# Patient Record
Sex: MALE | Race: BLACK OR AFRICAN AMERICAN
[De-identification: names, ages, dates, MRNs, and addresses within clinical notes are randomized per-mention and may not be internally consistent; named-entity substitution may affect disease eponyms.]

---

## 2017-05-30 ENCOUNTER — HOSPITAL ENCOUNTER (OUTPATIENT)
Dept: HOSPITAL 35 - RAD | Age: 67
End: 2017-05-30
Attending: INTERNAL MEDICINE
Payer: COMMERCIAL

## 2017-05-30 DIAGNOSIS — M16.11: Primary | ICD-10-CM

## 2017-05-30 DIAGNOSIS — M19.021: ICD-10-CM

## 2017-09-29 ENCOUNTER — HOSPITAL ENCOUNTER (OUTPATIENT)
Dept: HOSPITAL 35 - MRI | Age: 67
End: 2017-09-29
Attending: INTERNAL MEDICINE
Payer: COMMERCIAL

## 2017-09-29 DIAGNOSIS — M47.896: Primary | ICD-10-CM

## 2017-09-29 DIAGNOSIS — M16.11: ICD-10-CM

## 2017-10-11 ENCOUNTER — HOSPITAL ENCOUNTER (OUTPATIENT)
Dept: HOSPITAL 61 - PCVCCLINIC | Age: 67
Discharge: HOME | End: 2017-10-11
Attending: INTERNAL MEDICINE
Payer: MEDICARE

## 2017-10-11 DIAGNOSIS — Z88.8: ICD-10-CM

## 2017-10-11 DIAGNOSIS — I42.9: ICD-10-CM

## 2017-10-11 DIAGNOSIS — Z87.891: ICD-10-CM

## 2017-10-11 DIAGNOSIS — Z95.1: ICD-10-CM

## 2017-10-11 DIAGNOSIS — I10: ICD-10-CM

## 2017-10-11 DIAGNOSIS — E78.00: ICD-10-CM

## 2017-10-11 DIAGNOSIS — Z79.82: ICD-10-CM

## 2017-10-11 DIAGNOSIS — Z79.899: ICD-10-CM

## 2017-10-11 DIAGNOSIS — I25.10: Primary | ICD-10-CM

## 2017-10-11 DIAGNOSIS — M48.00: ICD-10-CM

## 2017-10-11 PROCEDURE — 80061 LIPID PANEL: CPT

## 2017-10-11 PROCEDURE — 93005 ELECTROCARDIOGRAM TRACING: CPT

## 2017-10-11 PROCEDURE — G0463 HOSPITAL OUTPT CLINIC VISIT: HCPCS

## 2017-10-16 ENCOUNTER — HOSPITAL ENCOUNTER (OUTPATIENT)
Dept: HOSPITAL 61 - PCVCINTER | Age: 67
Discharge: HOME | End: 2017-10-16
Attending: INTERNAL MEDICINE
Payer: MEDICARE

## 2017-10-16 DIAGNOSIS — Z70.1: ICD-10-CM

## 2017-10-16 DIAGNOSIS — I70.213: Primary | ICD-10-CM

## 2017-10-16 DIAGNOSIS — I10: ICD-10-CM

## 2017-10-16 DIAGNOSIS — I25.10: ICD-10-CM

## 2017-10-16 PROCEDURE — 76937 US GUIDE VASCULAR ACCESS: CPT

## 2017-10-16 PROCEDURE — 36246 INS CATH ABD/L-EXT ART 2ND: CPT

## 2017-10-16 PROCEDURE — 99152 MOD SED SAME PHYS/QHP 5/>YRS: CPT

## 2017-10-16 PROCEDURE — C1894 INTRO/SHEATH, NON-LASER: HCPCS

## 2017-10-16 PROCEDURE — C1769 GUIDE WIRE: HCPCS

## 2017-10-16 PROCEDURE — 36252 INS CATH REN ART 1ST BILAT: CPT

## 2017-10-16 PROCEDURE — 75716 ARTERY X-RAYS ARMS/LEGS: CPT

## 2017-10-16 PROCEDURE — C1751 CATH, INF, PER/CENT/MIDLINE: HCPCS

## 2017-10-16 PROCEDURE — 99153 MOD SED SAME PHYS/QHP EA: CPT

## 2017-10-16 PROCEDURE — 93459 L HRT ART/GRFT ANGIO: CPT

## 2017-10-16 NOTE — PCVCINTER
EXAM:

1. AORTOGRAM AND BILATERAL LOWER EXTREMITY RUNOFF ANGIOGRAM

2. BILATERAL RENAL ANGIOGRAPHY



INDICATION: Peripheral arterial disease. Coronary artery disease.

Nonhealing ulcer right lower extremity. Hypertension. Renal

atherosclerosis.



PROCEDURE: Procedure and risks of angiography intervention is

appropriate including limb loss stroke and death were discussed with

the patient's family and consent obtained. The a 6 Zambian sheath in

the right common femoral artery from previous cardiac catheterization

a 5 Zambian flush catheter was placed into the abdominal aorta at the

level of the renal arteries and AP aortogram was performed. Catheter

was positioned at the aortic bifurcation and both oblique views of the

pelvis were obtained. Catheter was positioned into the right external

iliac artery and right leg runoff angiography was performed. Catheter

was exchanged for a visceral catheter which was placed into the right

renal arteries and right renal angiograms obtained. Catheter was

placed into the the left renal arteries and left renal angiograms were

obtained. Catheter was advanced to the level of the left external

iliac artery and left leg runoff angiography was obtained. Catheters

and wires removed. Sheath was removed and hemostasis obtained using

the FISH device. No immediate complications.



FINDINGS:

Aortogram: There is one right and one left renal artery. Mild to

moderate plaque infrarenal abdominal aorta without significant

stenosis or aneurysm.

Pelvis: Mild plaque in the right left common iliac arteries without

significant stenosis. Both internal iliac arteries are patent.

Moderate plaque throughout the mid right external iliac artery causes

mild stenosis not felt to be flow-limiting. Mild plaque left external

iliac artery without significant stenosis. The right and left common

femoral and profunda femoral arteries are patent.

Right renal artery: Mild plaque proximal vessel does not cause

significant stenosis.

Left renal artery: Mild plaque proximal vessel does not cause

significant stenosis.

Right leg: Moderate plaque at the superficial femoral artery and

popliteal artery without flow-limiting stenosis. The peroneal artery

and posterior tibial artery are the dominant runoff vessels and show

good patency throughout. High-grade stenosis mid to distal anterior

tibial artery.

Left leg: Scattered plaque throughout the superficial femoral artery

and popliteal artery without flow-limiting stenosis. The peroneal

artery and posterior tibial arteries are the dominant runoff vessels

and show good patency throughout. Mild stenosis proximal anterior

tibial artery with occlusion of the distal anterior tibial artery.



IMPRESSION:

High-grade stenosis mid to distal right anterior tibial artery and

occlusion of the distal left anterior tibial artery.

Otherwise no flow-limiting stenosis in either lower extremity.

No high-grade aortoiliac stenosis.

follow up



LOC:[id number]

## 2017-10-17 NOTE — PCVCINTER
--------------- APPROVED REPORT --------------





Patient Details

Patient Status:                   Room #: 4

The patient is a 67 year-old Male



Event Personnel

Xander CULP MD, David Cruz RN, Daisy HERNANDEZ MD, Seda Cruz RT(R)(VI), Yecenia Monreal RT(R)



Risk Factors

Arterial HypertensionDysplipidemia (Type: 1), Family History, 

Hypercholesterolemia, Last Creatanine 1Tobacco History 

(Former)



Previous Procedures/Diagnoses

Previous CABG, Previous Femoral Procedure, Previous CHFPrevious MI, 

CAD, Hypertension, LV dysfunction



Procedure Narrative

The patient was brought electively to the Cardiac Catheterization 

Laboratory and was prepped and draped in a sterile manner. The right 

femoral was infiltrated with 1% Lidocaine subcutaneous anesthesia. A 

sheath was inserted into the right femoral artery. Coronary 

angiography was performed using coronary diagnostic catheters. The 

right coronary system was accessed and visualized with a JR4 

catheter. The left coronary system was accessed and visualized with a 

JL4 catheter. The left ventricle was accessed and visualized with a 

Straight pigtail catheter. Left ventriculogram was performed in PALMER 

projection. An aortogram of the abdominal aorta was performed. 

Pre-demployment femoral angiogram was performed . Closure device was 

deployed with a 6 Fr Fish. Hemostasis was obtained with manual 

pressure following sheath removal without any complications. The 

patient tolerated the procedure well and there were no complications 

associated with the procedure. There was no 

hematoma.



Hemodynamics

The right atrial mean pressure is 3 mmHg. The right ventricular 

pressure is 120 mmHg. The pulmonary artery pressure is 120 mmHg with 

a mean of 3 mmHg. The mean pulmonary capillary wedge pressure is 3 

mmHg. The aortic pressure is 124/66 mmHg with a mean of 92 mmHg. The 

left ventricular pressure is 120/4 mmHg with a mean of 3 mmHg. 



Conclusion

#1 normal left ventricular size with mild inferior basilar and apical 

hypokinesis EF 45-50% range

#2 abdominal aorta is intact without aneurysm single renal arteries 

and iliac with mild disease

#3 mild ostial disease of the left main 30% giving rise to LAD and 

circumflex

#4 LAD is moderately calcified and somewhat of an ectatic vessel with 

an eccentric lesions distally of 50-60% small diagonal system may be 

occluded

#5 nondominant circumflex with the proximal OM occlusion

#6 dominant right eccentric 70% in the mid vessel long subtotal area 

competitively filling a posterior lateral branch and no filling of 

the PDA

#7 SVG to the PDA is briskly filling the posterior descending and 

retrograde collaterally filling the posterior lateral branch no 

significant graft disease

#8 SVG to a diagonal and filling a large OM jump graft small diagonal 

moderate size OM to widely patent graft widely 

patent



Recommendations and plan continue aggressive risk factor modification 

etiology of his chest pain is noncardiac.

No lifting for 48 hours to line tub Jacuzzi or Lake for a week

I have added a combination baby aspirin and Prilosec pill I suspect 

some of this chest pain is GI in etiology. Also spoke of some dietary 

restrictions. Follow-up is arranged

## 2018-03-01 ENCOUNTER — HOSPITAL ENCOUNTER (OUTPATIENT)
Dept: HOSPITAL 61 - PCVCCLINIC | Age: 68
Discharge: HOME | End: 2018-03-01
Attending: INTERNAL MEDICINE
Payer: MEDICARE

## 2018-03-01 DIAGNOSIS — Z79.899: ICD-10-CM

## 2018-03-01 DIAGNOSIS — E78.00: ICD-10-CM

## 2018-03-01 DIAGNOSIS — F17.210: ICD-10-CM

## 2018-03-01 DIAGNOSIS — I73.9: ICD-10-CM

## 2018-03-01 DIAGNOSIS — R94.31: ICD-10-CM

## 2018-03-01 DIAGNOSIS — I42.9: ICD-10-CM

## 2018-03-01 DIAGNOSIS — M48.00: ICD-10-CM

## 2018-03-01 DIAGNOSIS — I10: ICD-10-CM

## 2018-03-01 DIAGNOSIS — I25.10: Primary | ICD-10-CM

## 2018-03-01 DIAGNOSIS — Z79.82: ICD-10-CM

## 2018-03-01 PROCEDURE — 80061 LIPID PANEL: CPT

## 2018-03-01 PROCEDURE — 93005 ELECTROCARDIOGRAM TRACING: CPT

## 2018-05-09 ENCOUNTER — HOSPITAL ENCOUNTER (EMERGENCY)
Dept: HOSPITAL 35 - ER | Age: 68
Discharge: LEFT BEFORE DISCHARGE | End: 2018-05-09
Payer: COMMERCIAL

## 2018-05-09 DIAGNOSIS — Z53.21: Primary | ICD-10-CM

## 2019-03-21 ENCOUNTER — HOSPITAL ENCOUNTER (INPATIENT)
Dept: HOSPITAL 35 - ER | Age: 69
LOS: 4 days | Discharge: LEFT BEFORE DISCHARGE | DRG: 377 | End: 2019-03-25
Attending: HOSPITALIST | Admitting: HOSPITALIST
Payer: COMMERCIAL

## 2019-03-21 VITALS — SYSTOLIC BLOOD PRESSURE: 143 MMHG | DIASTOLIC BLOOD PRESSURE: 83 MMHG

## 2019-03-21 VITALS — SYSTOLIC BLOOD PRESSURE: 139 MMHG | DIASTOLIC BLOOD PRESSURE: 75 MMHG

## 2019-03-21 VITALS — SYSTOLIC BLOOD PRESSURE: 136 MMHG | DIASTOLIC BLOOD PRESSURE: 76 MMHG

## 2019-03-21 VITALS — SYSTOLIC BLOOD PRESSURE: 139 MMHG | DIASTOLIC BLOOD PRESSURE: 72 MMHG

## 2019-03-21 VITALS — SYSTOLIC BLOOD PRESSURE: 139 MMHG | DIASTOLIC BLOOD PRESSURE: 83 MMHG

## 2019-03-21 VITALS — SYSTOLIC BLOOD PRESSURE: 125 MMHG | DIASTOLIC BLOOD PRESSURE: 62 MMHG

## 2019-03-21 VITALS — DIASTOLIC BLOOD PRESSURE: 59 MMHG | SYSTOLIC BLOOD PRESSURE: 124 MMHG

## 2019-03-21 VITALS — DIASTOLIC BLOOD PRESSURE: 71 MMHG | SYSTOLIC BLOOD PRESSURE: 131 MMHG

## 2019-03-21 VITALS — SYSTOLIC BLOOD PRESSURE: 112 MMHG | DIASTOLIC BLOOD PRESSURE: 74 MMHG

## 2019-03-21 VITALS — SYSTOLIC BLOOD PRESSURE: 134 MMHG | DIASTOLIC BLOOD PRESSURE: 75 MMHG

## 2019-03-21 VITALS — SYSTOLIC BLOOD PRESSURE: 105 MMHG | DIASTOLIC BLOOD PRESSURE: 58 MMHG

## 2019-03-21 VITALS — DIASTOLIC BLOOD PRESSURE: 77 MMHG | SYSTOLIC BLOOD PRESSURE: 138 MMHG

## 2019-03-21 VITALS — SYSTOLIC BLOOD PRESSURE: 135 MMHG | DIASTOLIC BLOOD PRESSURE: 78 MMHG

## 2019-03-21 VITALS — SYSTOLIC BLOOD PRESSURE: 142 MMHG | DIASTOLIC BLOOD PRESSURE: 66 MMHG

## 2019-03-21 VITALS — SYSTOLIC BLOOD PRESSURE: 132 MMHG | DIASTOLIC BLOOD PRESSURE: 73 MMHG

## 2019-03-21 VITALS — SYSTOLIC BLOOD PRESSURE: 140 MMHG | DIASTOLIC BLOOD PRESSURE: 53 MMHG

## 2019-03-21 VITALS — DIASTOLIC BLOOD PRESSURE: 60 MMHG | SYSTOLIC BLOOD PRESSURE: 109 MMHG

## 2019-03-21 VITALS — SYSTOLIC BLOOD PRESSURE: 128 MMHG | DIASTOLIC BLOOD PRESSURE: 57 MMHG

## 2019-03-21 VITALS — WEIGHT: 164.25 LBS | BODY MASS INDEX: 21.77 KG/M2 | HEIGHT: 72.99 IN

## 2019-03-21 VITALS — SYSTOLIC BLOOD PRESSURE: 131 MMHG | DIASTOLIC BLOOD PRESSURE: 77 MMHG

## 2019-03-21 VITALS — DIASTOLIC BLOOD PRESSURE: 60 MMHG | SYSTOLIC BLOOD PRESSURE: 123 MMHG

## 2019-03-21 VITALS — DIASTOLIC BLOOD PRESSURE: 64 MMHG | SYSTOLIC BLOOD PRESSURE: 110 MMHG

## 2019-03-21 VITALS — SYSTOLIC BLOOD PRESSURE: 123 MMHG | DIASTOLIC BLOOD PRESSURE: 64 MMHG

## 2019-03-21 VITALS — DIASTOLIC BLOOD PRESSURE: 55 MMHG | SYSTOLIC BLOOD PRESSURE: 105 MMHG

## 2019-03-21 VITALS — DIASTOLIC BLOOD PRESSURE: 55 MMHG | SYSTOLIC BLOOD PRESSURE: 119 MMHG

## 2019-03-21 VITALS — DIASTOLIC BLOOD PRESSURE: 73 MMHG | SYSTOLIC BLOOD PRESSURE: 124 MMHG

## 2019-03-21 VITALS — DIASTOLIC BLOOD PRESSURE: 76 MMHG | SYSTOLIC BLOOD PRESSURE: 136 MMHG

## 2019-03-21 VITALS — DIASTOLIC BLOOD PRESSURE: 71 MMHG | SYSTOLIC BLOOD PRESSURE: 136 MMHG

## 2019-03-21 VITALS — SYSTOLIC BLOOD PRESSURE: 143 MMHG | DIASTOLIC BLOOD PRESSURE: 78 MMHG

## 2019-03-21 VITALS — SYSTOLIC BLOOD PRESSURE: 144 MMHG | DIASTOLIC BLOOD PRESSURE: 84 MMHG

## 2019-03-21 VITALS — DIASTOLIC BLOOD PRESSURE: 62 MMHG | SYSTOLIC BLOOD PRESSURE: 112 MMHG

## 2019-03-21 DIAGNOSIS — Z88.5: ICD-10-CM

## 2019-03-21 DIAGNOSIS — Z88.0: ICD-10-CM

## 2019-03-21 DIAGNOSIS — M13.871: ICD-10-CM

## 2019-03-21 DIAGNOSIS — I25.10: ICD-10-CM

## 2019-03-21 DIAGNOSIS — M13.862: ICD-10-CM

## 2019-03-21 DIAGNOSIS — M13.872: ICD-10-CM

## 2019-03-21 DIAGNOSIS — E78.5: ICD-10-CM

## 2019-03-21 DIAGNOSIS — D62: ICD-10-CM

## 2019-03-21 DIAGNOSIS — E87.2: ICD-10-CM

## 2019-03-21 DIAGNOSIS — F10.10: ICD-10-CM

## 2019-03-21 DIAGNOSIS — K25.4: Primary | ICD-10-CM

## 2019-03-21 DIAGNOSIS — I10: ICD-10-CM

## 2019-03-21 DIAGNOSIS — R63.4: ICD-10-CM

## 2019-03-21 DIAGNOSIS — E87.6: ICD-10-CM

## 2019-03-21 DIAGNOSIS — K76.0: ICD-10-CM

## 2019-03-21 DIAGNOSIS — F17.210: ICD-10-CM

## 2019-03-21 DIAGNOSIS — K26.9: ICD-10-CM

## 2019-03-21 DIAGNOSIS — E87.0: ICD-10-CM

## 2019-03-21 DIAGNOSIS — Z95.1: ICD-10-CM

## 2019-03-21 DIAGNOSIS — M13.861: ICD-10-CM

## 2019-03-21 DIAGNOSIS — J69.0: ICD-10-CM

## 2019-03-21 DIAGNOSIS — Z79.899: ICD-10-CM

## 2019-03-21 DIAGNOSIS — G93.40: ICD-10-CM

## 2019-03-21 DIAGNOSIS — Z88.8: ICD-10-CM

## 2019-03-21 DIAGNOSIS — Z53.21: ICD-10-CM

## 2019-03-21 LAB
ANION GAP SERPL CALC-SCNC: 21 MMOL/L (ref 7–16)
APTT BLD: 23.8 SECONDS (ref 24.5–32.8)
BASOPHILS NFR BLD AUTO: 0.3 % (ref 0–2)
BE(VIVO): -11.7 MMOL/L
BUN SERPL-MCNC: 37 MG/DL (ref 7–18)
CALCIUM SERPL-MCNC: 7.5 MG/DL (ref 8.5–10.1)
CHLORIDE SERPL-SCNC: 113 MMOL/L (ref 98–107)
CO2 SERPL-SCNC: 17 MMOL/L (ref 21–32)
CREAT SERPL-MCNC: 1.7 MG/DL (ref 0.7–1.3)
EOSINOPHIL NFR BLD: 0 % (ref 0–3)
ERYTHROCYTE [DISTWIDTH] IN BLOOD BY AUTOMATED COUNT: 17 % (ref 10.5–14.5)
GLUCOSE SERPL-MCNC: 190 MG/DL (ref 74–106)
GRANULOCYTES NFR BLD MANUAL: 83.8 % (ref 36–66)
HCO3 BLD-SCNC: 12.7 MMOL/L (ref 22–26)
HCT VFR BLD CALC: 15.6 % (ref 42–52)
HCT VFR BLD CALC: 26.5 % (ref 42–52)
HGB BLD-MCNC: 4.7 GM/DL (ref 14–18)
HGB BLD-MCNC: 8.7 GM/DL (ref 14–18)
INR PPP: 1.3
LYMPHOCYTES NFR BLD AUTO: 9.9 % (ref 24–44)
MCH RBC QN AUTO: 31.7 PG (ref 26–34)
MCHC RBC AUTO-ENTMCNC: 30.2 G/DL (ref 28–37)
MCV RBC: 105 FL (ref 80–100)
MONOCYTES NFR BLD: 6 % (ref 1–8)
NEUTROPHILS # BLD: 12.5 THOU/UL (ref 1.4–8.2)
PCO2 BLD: 22.2 MMHG (ref 35–45)
PLATELET # BLD: 176 THOU/UL (ref 150–400)
PO2 BLD: 94.5 MMHG (ref 80–100)
POTASSIUM SERPL-SCNC: 3.3 MMOL/L (ref 3.5–5.1)
PROTHROMBIN TIME: 13.1 SECONDS (ref 9.3–11.4)
RBC # BLD AUTO: 1.48 MIL/UL (ref 4.5–6)
SODIUM SERPL-SCNC: 151 MMOL/L (ref 136–145)
TROPONIN I SERPL-MCNC: 0.07 NG/ML (ref ?–0.06)
WBC # BLD AUTO: 14.9 THOU/UL (ref 4–11)

## 2019-03-21 PROCEDURE — 62110: CPT

## 2019-03-21 PROCEDURE — 10078: CPT

## 2019-03-21 PROCEDURE — 10081 I&D PILONIDAL CYST COMP: CPT

## 2019-03-21 PROCEDURE — 62900: CPT

## 2019-03-21 PROCEDURE — 27000 TENOTOMY ADDUCTOR HIP PERQ: CPT

## 2019-03-22 VITALS — SYSTOLIC BLOOD PRESSURE: 120 MMHG | DIASTOLIC BLOOD PRESSURE: 65 MMHG

## 2019-03-22 VITALS — SYSTOLIC BLOOD PRESSURE: 146 MMHG | DIASTOLIC BLOOD PRESSURE: 85 MMHG

## 2019-03-22 VITALS — SYSTOLIC BLOOD PRESSURE: 147 MMHG | DIASTOLIC BLOOD PRESSURE: 81 MMHG

## 2019-03-22 VITALS — DIASTOLIC BLOOD PRESSURE: 76 MMHG | SYSTOLIC BLOOD PRESSURE: 130 MMHG

## 2019-03-22 VITALS — SYSTOLIC BLOOD PRESSURE: 125 MMHG | DIASTOLIC BLOOD PRESSURE: 80 MMHG

## 2019-03-22 VITALS — SYSTOLIC BLOOD PRESSURE: 141 MMHG | DIASTOLIC BLOOD PRESSURE: 68 MMHG

## 2019-03-22 VITALS — SYSTOLIC BLOOD PRESSURE: 121 MMHG | DIASTOLIC BLOOD PRESSURE: 65 MMHG

## 2019-03-22 VITALS — DIASTOLIC BLOOD PRESSURE: 75 MMHG | SYSTOLIC BLOOD PRESSURE: 133 MMHG

## 2019-03-22 VITALS — DIASTOLIC BLOOD PRESSURE: 64 MMHG | SYSTOLIC BLOOD PRESSURE: 119 MMHG

## 2019-03-22 VITALS — DIASTOLIC BLOOD PRESSURE: 66 MMHG | SYSTOLIC BLOOD PRESSURE: 132 MMHG

## 2019-03-22 VITALS — DIASTOLIC BLOOD PRESSURE: 64 MMHG | SYSTOLIC BLOOD PRESSURE: 121 MMHG

## 2019-03-22 VITALS — DIASTOLIC BLOOD PRESSURE: 56 MMHG | SYSTOLIC BLOOD PRESSURE: 128 MMHG

## 2019-03-22 VITALS — SYSTOLIC BLOOD PRESSURE: 126 MMHG | DIASTOLIC BLOOD PRESSURE: 63 MMHG

## 2019-03-22 VITALS — DIASTOLIC BLOOD PRESSURE: 83 MMHG | SYSTOLIC BLOOD PRESSURE: 153 MMHG

## 2019-03-22 VITALS — DIASTOLIC BLOOD PRESSURE: 69 MMHG | SYSTOLIC BLOOD PRESSURE: 127 MMHG

## 2019-03-22 VITALS — DIASTOLIC BLOOD PRESSURE: 62 MMHG | SYSTOLIC BLOOD PRESSURE: 120 MMHG

## 2019-03-22 VITALS — SYSTOLIC BLOOD PRESSURE: 141 MMHG | DIASTOLIC BLOOD PRESSURE: 71 MMHG

## 2019-03-22 VITALS — DIASTOLIC BLOOD PRESSURE: 92 MMHG | SYSTOLIC BLOOD PRESSURE: 147 MMHG

## 2019-03-22 VITALS — SYSTOLIC BLOOD PRESSURE: 123 MMHG | DIASTOLIC BLOOD PRESSURE: 64 MMHG

## 2019-03-22 VITALS — DIASTOLIC BLOOD PRESSURE: 80 MMHG | SYSTOLIC BLOOD PRESSURE: 150 MMHG

## 2019-03-22 VITALS — SYSTOLIC BLOOD PRESSURE: 129 MMHG | DIASTOLIC BLOOD PRESSURE: 74 MMHG

## 2019-03-22 VITALS — SYSTOLIC BLOOD PRESSURE: 134 MMHG | DIASTOLIC BLOOD PRESSURE: 63 MMHG

## 2019-03-22 VITALS — SYSTOLIC BLOOD PRESSURE: 130 MMHG | DIASTOLIC BLOOD PRESSURE: 65 MMHG

## 2019-03-22 VITALS — SYSTOLIC BLOOD PRESSURE: 114 MMHG | DIASTOLIC BLOOD PRESSURE: 60 MMHG

## 2019-03-22 VITALS — DIASTOLIC BLOOD PRESSURE: 73 MMHG | SYSTOLIC BLOOD PRESSURE: 132 MMHG

## 2019-03-22 VITALS — DIASTOLIC BLOOD PRESSURE: 56 MMHG | SYSTOLIC BLOOD PRESSURE: 118 MMHG

## 2019-03-22 VITALS — DIASTOLIC BLOOD PRESSURE: 68 MMHG | SYSTOLIC BLOOD PRESSURE: 125 MMHG

## 2019-03-22 VITALS — DIASTOLIC BLOOD PRESSURE: 69 MMHG | SYSTOLIC BLOOD PRESSURE: 131 MMHG

## 2019-03-22 VITALS — SYSTOLIC BLOOD PRESSURE: 126 MMHG | DIASTOLIC BLOOD PRESSURE: 64 MMHG

## 2019-03-22 VITALS — DIASTOLIC BLOOD PRESSURE: 72 MMHG | SYSTOLIC BLOOD PRESSURE: 133 MMHG

## 2019-03-22 VITALS — DIASTOLIC BLOOD PRESSURE: 45 MMHG | SYSTOLIC BLOOD PRESSURE: 111 MMHG

## 2019-03-22 VITALS — DIASTOLIC BLOOD PRESSURE: 72 MMHG | SYSTOLIC BLOOD PRESSURE: 129 MMHG

## 2019-03-22 VITALS — DIASTOLIC BLOOD PRESSURE: 72 MMHG | SYSTOLIC BLOOD PRESSURE: 111 MMHG

## 2019-03-22 VITALS — DIASTOLIC BLOOD PRESSURE: 66 MMHG | SYSTOLIC BLOOD PRESSURE: 129 MMHG

## 2019-03-22 VITALS — SYSTOLIC BLOOD PRESSURE: 124 MMHG | DIASTOLIC BLOOD PRESSURE: 63 MMHG

## 2019-03-22 VITALS — SYSTOLIC BLOOD PRESSURE: 131 MMHG | DIASTOLIC BLOOD PRESSURE: 76 MMHG

## 2019-03-22 VITALS — SYSTOLIC BLOOD PRESSURE: 132 MMHG | DIASTOLIC BLOOD PRESSURE: 70 MMHG

## 2019-03-22 VITALS — SYSTOLIC BLOOD PRESSURE: 133 MMHG | DIASTOLIC BLOOD PRESSURE: 65 MMHG

## 2019-03-22 VITALS — SYSTOLIC BLOOD PRESSURE: 126 MMHG | DIASTOLIC BLOOD PRESSURE: 68 MMHG

## 2019-03-22 VITALS — DIASTOLIC BLOOD PRESSURE: 73 MMHG | SYSTOLIC BLOOD PRESSURE: 127 MMHG

## 2019-03-22 VITALS — SYSTOLIC BLOOD PRESSURE: 137 MMHG | DIASTOLIC BLOOD PRESSURE: 66 MMHG

## 2019-03-22 VITALS — DIASTOLIC BLOOD PRESSURE: 72 MMHG | SYSTOLIC BLOOD PRESSURE: 138 MMHG

## 2019-03-22 VITALS — SYSTOLIC BLOOD PRESSURE: 130 MMHG | DIASTOLIC BLOOD PRESSURE: 71 MMHG

## 2019-03-22 VITALS — SYSTOLIC BLOOD PRESSURE: 159 MMHG | DIASTOLIC BLOOD PRESSURE: 89 MMHG

## 2019-03-22 VITALS — SYSTOLIC BLOOD PRESSURE: 113 MMHG | DIASTOLIC BLOOD PRESSURE: 58 MMHG

## 2019-03-22 VITALS — SYSTOLIC BLOOD PRESSURE: 129 MMHG | DIASTOLIC BLOOD PRESSURE: 65 MMHG

## 2019-03-22 VITALS — SYSTOLIC BLOOD PRESSURE: 139 MMHG | DIASTOLIC BLOOD PRESSURE: 79 MMHG

## 2019-03-22 VITALS — DIASTOLIC BLOOD PRESSURE: 61 MMHG | SYSTOLIC BLOOD PRESSURE: 128 MMHG

## 2019-03-22 VITALS — DIASTOLIC BLOOD PRESSURE: 64 MMHG | SYSTOLIC BLOOD PRESSURE: 132 MMHG

## 2019-03-22 VITALS — SYSTOLIC BLOOD PRESSURE: 137 MMHG | DIASTOLIC BLOOD PRESSURE: 76 MMHG

## 2019-03-22 VITALS — SYSTOLIC BLOOD PRESSURE: 134 MMHG | DIASTOLIC BLOOD PRESSURE: 76 MMHG

## 2019-03-22 VITALS — DIASTOLIC BLOOD PRESSURE: 67 MMHG | SYSTOLIC BLOOD PRESSURE: 128 MMHG

## 2019-03-22 VITALS — SYSTOLIC BLOOD PRESSURE: 106 MMHG | DIASTOLIC BLOOD PRESSURE: 67 MMHG

## 2019-03-22 VITALS — DIASTOLIC BLOOD PRESSURE: 66 MMHG | SYSTOLIC BLOOD PRESSURE: 119 MMHG

## 2019-03-22 VITALS — SYSTOLIC BLOOD PRESSURE: 129 MMHG | DIASTOLIC BLOOD PRESSURE: 68 MMHG

## 2019-03-22 VITALS — DIASTOLIC BLOOD PRESSURE: 83 MMHG | SYSTOLIC BLOOD PRESSURE: 146 MMHG

## 2019-03-22 VITALS — SYSTOLIC BLOOD PRESSURE: 129 MMHG | DIASTOLIC BLOOD PRESSURE: 94 MMHG

## 2019-03-22 VITALS — DIASTOLIC BLOOD PRESSURE: 76 MMHG | SYSTOLIC BLOOD PRESSURE: 120 MMHG

## 2019-03-22 VITALS — SYSTOLIC BLOOD PRESSURE: 118 MMHG | DIASTOLIC BLOOD PRESSURE: 62 MMHG

## 2019-03-22 VITALS — SYSTOLIC BLOOD PRESSURE: 107 MMHG | DIASTOLIC BLOOD PRESSURE: 59 MMHG

## 2019-03-22 VITALS — SYSTOLIC BLOOD PRESSURE: 121 MMHG | DIASTOLIC BLOOD PRESSURE: 76 MMHG

## 2019-03-22 LAB
ALBUMIN SERPL-MCNC: 1.9 G/DL (ref 3.4–5)
ALT SERPL-CCNC: 21 U/L (ref 30–65)
ANION GAP SERPL CALC-SCNC: 8 MMOL/L (ref 7–16)
ANION GAP SERPL CALC-SCNC: 8 MMOL/L (ref 7–16)
AST SERPL-CCNC: 39 U/L (ref 15–37)
BASOPHILS NFR BLD AUTO: 0.5 % (ref 0–2)
BASOPHILS NFR BLD AUTO: 0.6 % (ref 0–2)
BILIRUB DIRECT SERPL-MCNC: 0.2 MG/DL
BILIRUB SERPL-MCNC: 0.6 MG/DL
BUN SERPL-MCNC: 33 MG/DL (ref 7–18)
BUN SERPL-MCNC: 37 MG/DL (ref 7–18)
CALCIUM SERPL-MCNC: 6.6 MG/DL (ref 8.5–10.1)
CALCIUM SERPL-MCNC: 6.8 MG/DL (ref 8.5–10.1)
CHLORIDE SERPL-SCNC: 116 MMOL/L (ref 98–107)
CHLORIDE SERPL-SCNC: 116 MMOL/L (ref 98–107)
CO2 SERPL-SCNC: 24 MMOL/L (ref 21–32)
CO2 SERPL-SCNC: 26 MMOL/L (ref 21–32)
CREAT SERPL-MCNC: 1 MG/DL (ref 0.7–1.3)
CREAT SERPL-MCNC: 1.2 MG/DL (ref 0.7–1.3)
EOSINOPHIL NFR BLD: 0.1 % (ref 0–3)
EOSINOPHIL NFR BLD: 0.2 % (ref 0–3)
ERYTHROCYTE [DISTWIDTH] IN BLOOD BY AUTOMATED COUNT: 16.4 % (ref 10.5–14.5)
ERYTHROCYTE [DISTWIDTH] IN BLOOD BY AUTOMATED COUNT: 16.5 % (ref 10.5–14.5)
ERYTHROCYTE [DISTWIDTH] IN BLOOD BY AUTOMATED COUNT: 16.5 % (ref 10.5–14.5)
GLUCOSE SERPL-MCNC: 101 MG/DL (ref 74–106)
GLUCOSE SERPL-MCNC: 121 MG/DL (ref 74–106)
GRANULOCYTES NFR BLD MANUAL: 75 % (ref 36–66)
GRANULOCYTES NFR BLD MANUAL: 78.4 % (ref 36–66)
HCT VFR BLD CALC: 20.1 % (ref 42–52)
HCT VFR BLD CALC: 20.8 % (ref 42–52)
HCT VFR BLD CALC: 20.9 % (ref 42–52)
HCT VFR BLD CALC: 25.6 % (ref 42–52)
HGB BLD-MCNC: 6.7 GM/DL (ref 14–18)
HGB BLD-MCNC: 7.1 GM/DL (ref 14–18)
HGB BLD-MCNC: 7.1 GM/DL (ref 14–18)
HGB BLD-MCNC: 8.5 GM/DL (ref 14–18)
INR PPP: 1.1
LYMPHOCYTES NFR BLD AUTO: 14.1 % (ref 24–44)
LYMPHOCYTES NFR BLD AUTO: 16.8 % (ref 24–44)
MAGNESIUM SERPL-MCNC: 1.6 MG/DL (ref 1.8–2.4)
MAGNESIUM SERPL-MCNC: 2.1 MG/DL (ref 1.8–2.4)
MCH RBC QN AUTO: 30.7 PG (ref 26–34)
MCH RBC QN AUTO: 31 PG (ref 26–34)
MCH RBC QN AUTO: 31.2 PG (ref 26–34)
MCHC RBC AUTO-ENTMCNC: 33.3 G/DL (ref 28–37)
MCHC RBC AUTO-ENTMCNC: 33.6 G/DL (ref 28–37)
MCHC RBC AUTO-ENTMCNC: 34.2 G/DL (ref 28–37)
MCV RBC: 91.2 FL (ref 80–100)
MCV RBC: 91.5 FL (ref 80–100)
MCV RBC: 93.2 FL (ref 80–100)
MONOCYTES NFR BLD: 6.9 % (ref 1–8)
MONOCYTES NFR BLD: 7.4 % (ref 1–8)
NEUTROPHILS # BLD: 10.6 THOU/UL (ref 1.4–8.2)
NEUTROPHILS # BLD: 9 THOU/UL (ref 1.4–8.2)
PLATELET # BLD: 116 THOU/UL (ref 150–400)
PLATELET # BLD: 119 THOU/UL (ref 150–400)
PLATELET # BLD: 126 THOU/UL (ref 150–400)
POTASSIUM SERPL-SCNC: 3.4 MMOL/L (ref 3.5–5.1)
POTASSIUM SERPL-SCNC: 3.4 MMOL/L (ref 3.5–5.1)
PROT SERPL-MCNC: 4.3 G/DL (ref 6.4–8.2)
PROTHROMBIN TIME: 12 SECONDS (ref 9.3–11.4)
RBC # BLD AUTO: 2.19 MIL/UL (ref 4.5–6)
RBC # BLD AUTO: 2.29 MIL/UL (ref 4.5–6)
RBC # BLD AUTO: 2.75 MIL/UL (ref 4.5–6)
SODIUM SERPL-SCNC: 148 MMOL/L (ref 136–145)
SODIUM SERPL-SCNC: 150 MMOL/L (ref 136–145)
TROPONIN I SERPL-MCNC: 0.07 NG/ML (ref ?–0.06)
WBC # BLD AUTO: 12 THOU/UL (ref 4–11)
WBC # BLD AUTO: 13.5 THOU/UL (ref 4–11)
WBC # BLD AUTO: 17.4 THOU/UL (ref 4–11)

## 2019-03-22 NOTE — NUR
PT IS ALERT TO SELF AND PLACE. FOREGETFULL ON THE YEAR. FAMILY AT BEDSIDE FOR
SUPPORT. CENTRAL LINE PLACED TODAY PER IV TEAM. LABS DRAWN. LUNGS ARE CLEAR ON
3 LITERS NASAL CANULA. SINUS RHYTHM ON THE CARDIAC MONITOR. ON CLEAR LIQUID
DIET TODAY. MOLINA TO DD WITH YELLOW URINE PRESENT. SCDS ON BILATERAL. SLEEPING
ON AND OFF TODAY. FAMILY AT BEDSIDE FOR SUPPORT. TODAY. NO CONCERNS OR ISSUES
NOTED AT THIS TIME. WILL CONTINUE TO ASSESS AND MONITOR PER NURSING

## 2019-03-22 NOTE — NUR
met with patient who admits with GI Bleed. He reports year 2018, and he is at
[facility]. He reports PCP is Dr Aparicio. Patient recalls being at [facility]
recently and reports he "discharged himself." He reports he called a friend at
0400 am to take him home. PTA independent with adls. Sp with sister to verify
information. Sister Sosa reports sometimes she stays with patient. She
reports he did leave AMA from [facility]. Sister wants medical team
to be aware he is a daily drinker and may become agitated at hospital. Updated
RN. Sister reports to call her she wants him to remain in hospital to get
well. Sister cell [phone]. Udpated RN. Patient does have PCP and
independent pta and driving. Sister supportive, casemgt following.

## 2019-03-22 NOTE — NUR
CONSULTED TO PLACE A PICC FOR A PATIENT IN ICU NEEDING ACCESS. ORDER AND
CONSENT NOTED. THE PROCEDURE AS WELL AS BENIFITS AND RISK FOR DVT AND
INFECTION DISCUSSED AND HE VERBALIZED UNDERSTANDING. THE LEFT UPPER ARM
BRACHIAL WAS WIDLEY PATENT. A #5F TRIPLE LUMEN POWER PICC WAS PLACED AFTER A
BEDSIDE TIMEOUT WAS COMPLETE. PICC WAS TRIMMED TO 42CM AND ADVANCED WITHOUT
DIFFICULTY PER HOSPITAL POLICY. A STAT CHEST XRAY CONFIRMED LINE TO BE IN
PROPER POSITION IN THE LOWER SVC. LINE REALEASED FOR USE

## 2019-03-22 NOTE — NUR
ARRIVED IN ICU FROM ER AT 2045. PT ADMITTED FOR GI BLEED. PT HAD ONE MAROON BM
BEFORE ARRIVING IN THE ICU AND HAS NOT HAD ANY EVIDENCE OF BLEEDING SINCE
THEN. PT RECEIVED 3 UNITS PRBC AND 2 UNITS FFP TOTAL. HGB HAD IMPROVED TO 8.7
AFTER THE TRANSFUSIONS, AND WAS 7.1 WHEN CHECKED ALMOST AN HOUR AGO. WILL
CHECK HGB AGAIN IN 3 HOURS. EGD DONE LAST NIGHT AFTER ARRIVAL TO THE ICU. PT
RECOVERED WELL. VITAL SIGNS STABLE AND A&O POST PROCEDURE. ON PROTONIX GTT.
UPDATED SISTER, JOLYNN, ON PT'S CONDITION. WILL CONTINUE TO MONITOR.

## 2019-03-23 VITALS — SYSTOLIC BLOOD PRESSURE: 138 MMHG | DIASTOLIC BLOOD PRESSURE: 74 MMHG

## 2019-03-23 VITALS — SYSTOLIC BLOOD PRESSURE: 119 MMHG | DIASTOLIC BLOOD PRESSURE: 65 MMHG

## 2019-03-23 VITALS — SYSTOLIC BLOOD PRESSURE: 153 MMHG | DIASTOLIC BLOOD PRESSURE: 83 MMHG

## 2019-03-23 VITALS — DIASTOLIC BLOOD PRESSURE: 96 MMHG | SYSTOLIC BLOOD PRESSURE: 162 MMHG

## 2019-03-23 VITALS — DIASTOLIC BLOOD PRESSURE: 78 MMHG | SYSTOLIC BLOOD PRESSURE: 155 MMHG

## 2019-03-23 VITALS — DIASTOLIC BLOOD PRESSURE: 73 MMHG | SYSTOLIC BLOOD PRESSURE: 127 MMHG

## 2019-03-23 VITALS — DIASTOLIC BLOOD PRESSURE: 75 MMHG | SYSTOLIC BLOOD PRESSURE: 142 MMHG

## 2019-03-23 VITALS — SYSTOLIC BLOOD PRESSURE: 114 MMHG | DIASTOLIC BLOOD PRESSURE: 61 MMHG

## 2019-03-23 VITALS — SYSTOLIC BLOOD PRESSURE: 140 MMHG | DIASTOLIC BLOOD PRESSURE: 77 MMHG

## 2019-03-23 VITALS — DIASTOLIC BLOOD PRESSURE: 80 MMHG | SYSTOLIC BLOOD PRESSURE: 149 MMHG

## 2019-03-23 VITALS — DIASTOLIC BLOOD PRESSURE: 73 MMHG | SYSTOLIC BLOOD PRESSURE: 140 MMHG

## 2019-03-23 VITALS — DIASTOLIC BLOOD PRESSURE: 77 MMHG | SYSTOLIC BLOOD PRESSURE: 140 MMHG

## 2019-03-23 VITALS — SYSTOLIC BLOOD PRESSURE: 151 MMHG | DIASTOLIC BLOOD PRESSURE: 85 MMHG

## 2019-03-23 LAB
ANION GAP SERPL CALC-SCNC: 7 MMOL/L (ref 7–16)
BUN SERPL-MCNC: 19 MG/DL (ref 7–18)
CALCIUM SERPL-MCNC: 7 MG/DL (ref 8.5–10.1)
CHLORIDE SERPL-SCNC: 114 MMOL/L (ref 98–107)
CO2 SERPL-SCNC: 23 MMOL/L (ref 21–32)
CREAT SERPL-MCNC: 0.8 MG/DL (ref 0.7–1.3)
GLUCOSE SERPL-MCNC: 72 MG/DL (ref 74–106)
HCT VFR BLD CALC: 22.6 % (ref 42–52)
HCT VFR BLD CALC: 24 % (ref 42–52)
HCT VFR BLD CALC: 24.4 % (ref 42–52)
HCT VFR BLD CALC: 25 % (ref 42–52)
HGB BLD-MCNC: 7.9 GM/DL (ref 14–18)
HGB BLD-MCNC: 8.2 GM/DL (ref 14–18)
HGB BLD-MCNC: 8.3 GM/DL (ref 14–18)
HGB BLD-MCNC: 8.6 GM/DL (ref 14–18)
POTASSIUM SERPL-SCNC: 3.8 MMOL/L (ref 3.5–5.1)
SODIUM SERPL-SCNC: 144 MMOL/L (ref 136–145)

## 2019-03-23 NOTE — NUR
PT ABLE TO REST THROUGHOUT SHIFT. CONTINUED WITH O2 THERAPY 3-4L PER NC. PT
RECEIVED ONE UNIT OF PRBCs DURING SHIFT. PT SR/ST ON MONITOR. PT TOLERATING
CLEAR LIQUIDS. PT CONTINUES ON MAINTENANCE IVF AND PROTONIX GTT. PT POTASSIUM
REPLACED AND NOW STABLE.

## 2019-03-23 NOTE — NUR
AA0X2 REORIENTS EASILY.  PLEASANT AND COOPERATIVE.  C/O PAIN IN RIGHT ARM,
RIGHT ARM FIRM AND EDEMATOUS.  INCONTINENT OF LARGE AMT LIQUID MAROON FOWL
ODOR STOOL.  PROVIDED SHOWER.  MOLINA DISCONTINUED AND URINATED LARGE AMOUNT ON
FLOOR.  CLEAR LIQUID DIET. IV LEFT UPPER ARM.

## 2019-03-23 NOTE — NUR
Assumed care of patient at 0700.  Patient resting in bed, c/o feeling hungry.
Tolerating clear liquids of ice chips and popcicles.  Denies nausea and abd
pain.  Continuing with Q6 H&H's, hopefully to advance diet if Hgb remains
stable per GI service.  Ok'd to move out of ICU.  Report called to RN on CCU,
patient to move to 204.  IVF and protonix gtt continue.

## 2019-03-24 VITALS — DIASTOLIC BLOOD PRESSURE: 74 MMHG | SYSTOLIC BLOOD PRESSURE: 143 MMHG

## 2019-03-24 VITALS — DIASTOLIC BLOOD PRESSURE: 96 MMHG | SYSTOLIC BLOOD PRESSURE: 151 MMHG

## 2019-03-24 VITALS — DIASTOLIC BLOOD PRESSURE: 82 MMHG | SYSTOLIC BLOOD PRESSURE: 136 MMHG

## 2019-03-24 VITALS — DIASTOLIC BLOOD PRESSURE: 91 MMHG | SYSTOLIC BLOOD PRESSURE: 151 MMHG

## 2019-03-24 VITALS — DIASTOLIC BLOOD PRESSURE: 70 MMHG | SYSTOLIC BLOOD PRESSURE: 112 MMHG

## 2019-03-24 LAB
ANION GAP SERPL CALC-SCNC: 13 MMOL/L (ref 7–16)
ANION GAP SERPL CALC-SCNC: 14 MMOL/L (ref 7–16)
BUN SERPL-MCNC: 7 MG/DL (ref 7–18)
BUN SERPL-MCNC: 7 MG/DL (ref 7–18)
CALCIUM SERPL-MCNC: 6.1 MG/DL (ref 8.5–10.1)
CALCIUM SERPL-MCNC: 7.1 MG/DL (ref 8.5–10.1)
CHLORIDE SERPL-SCNC: 104 MMOL/L (ref 98–107)
CHLORIDE SERPL-SCNC: 109 MMOL/L (ref 98–107)
CO2 SERPL-SCNC: 18 MMOL/L (ref 21–32)
CO2 SERPL-SCNC: 22 MMOL/L (ref 21–32)
CREAT SERPL-MCNC: 0.6 MG/DL (ref 0.7–1.3)
CREAT SERPL-MCNC: 0.7 MG/DL (ref 0.7–1.3)
ERYTHROCYTE [DISTWIDTH] IN BLOOD BY AUTOMATED COUNT: 15.9 % (ref 10.5–14.5)
GLUCOSE SERPL-MCNC: 58 MG/DL (ref 74–106)
GLUCOSE SERPL-MCNC: 60 MG/DL (ref 74–106)
HCT VFR BLD CALC: 22.4 % (ref 42–52)
HCT VFR BLD CALC: 25.2 % (ref 42–52)
HGB BLD-MCNC: 7.8 GM/DL (ref 14–18)
HGB BLD-MCNC: 8.9 GM/DL (ref 14–18)
MAGNESIUM SERPL-MCNC: 1.5 MG/DL (ref 1.8–2.4)
MAGNESIUM SERPL-MCNC: 2.1 MG/DL (ref 1.8–2.4)
MCH RBC QN AUTO: 32 PG (ref 26–34)
MCHC RBC AUTO-ENTMCNC: 34.9 G/DL (ref 28–37)
MCV RBC: 91.7 FL (ref 80–100)
PLATELET # BLD: 142 THOU/UL (ref 150–400)
POTASSIUM SERPL-SCNC: 2.6 MMOL/L (ref 3.5–5.1)
POTASSIUM SERPL-SCNC: 3.3 MMOL/L (ref 3.5–5.1)
POTASSIUM SERPL-SCNC: 3.4 MMOL/L (ref 3.5–5.1)
RBC # BLD AUTO: 2.45 MIL/UL (ref 4.5–6)
SODIUM SERPL-SCNC: 136 MMOL/L (ref 136–145)
SODIUM SERPL-SCNC: 144 MMOL/L (ref 136–145)
WBC # BLD AUTO: 8.3 THOU/UL (ref 4–11)

## 2019-03-24 NOTE — NUR
RECEIVED PT. CARE AROUND 1920; PT. ON BED WITH O2 OFF; GOWN OFF; EDUCATED
ABOUT CALLING BEFORE STANDING FROM BED; ST. UNDERSTANDING; DURING ASSESSMENT
C/O PAIN OVER RUE; PRN PAIN MEDICATION GIVEN; IRRITABLE WHEN NOT ANSWERING
QUESTIONS IMMEDIATELY; PAIN RE-ASSESSMENT; PT. SLEEPING; HH EARLY ON THE NIGHT
7.9; NO C/O HEADACHES OR DIZZINESS; PT. ABLE TO REST MOST OF THE NIGHT; VOID
PER URINAL; ASSESSMENT AS CHARGED; FOLLOWING POC; MONITORING HH; WILL PASS ON
REPORT.

## 2019-03-24 NOTE — NUR
ASSUMED CARE OF PT AT 0700.  PT A&OX4, UP WITH STANDBY TO BATHROOM.  PT HAD
LOW MG AND POTASSIUM AND PLACED ON ELECTROLYTE PROTOCOL.  LYTES BEING
REPLACED.  PT HAD POSITIONAL PICC LINE THAT FLUSHES AND DRAWS IF PT HAS ARM
ABOVE HEAD.  PT DID NOT HAVE BOWEL MOVEMENT TODAY.  DIET ADVANCED TO SOFT,
FIBER RESTRICTED BY GI.  PT'S HBG VALUES IMPROVING.  VITALS WITHIN NORMAL
LIMITS AND PT WAS SINUS RHYTYM ON TELEMETRY.  WILL CONT WITH POC.

## 2019-03-25 VITALS — SYSTOLIC BLOOD PRESSURE: 132 MMHG | DIASTOLIC BLOOD PRESSURE: 69 MMHG

## 2019-03-25 VITALS — SYSTOLIC BLOOD PRESSURE: 125 MMHG | DIASTOLIC BLOOD PRESSURE: 69 MMHG

## 2019-03-25 LAB
ANION GAP SERPL CALC-SCNC: 6 MMOL/L (ref 7–16)
BUN SERPL-MCNC: 5 MG/DL (ref 7–18)
CALCIUM SERPL-MCNC: 7 MG/DL (ref 8.5–10.1)
CHLORIDE SERPL-SCNC: 108 MMOL/L (ref 98–107)
CO2 SERPL-SCNC: 27 MMOL/L (ref 21–32)
CREAT SERPL-MCNC: 0.8 MG/DL (ref 0.7–1.3)
ERYTHROCYTE [DISTWIDTH] IN BLOOD BY AUTOMATED COUNT: 15.7 % (ref 10.5–14.5)
GLUCOSE SERPL-MCNC: 80 MG/DL (ref 74–106)
HCT VFR BLD CALC: 23.9 % (ref 42–52)
HGB BLD-MCNC: 8.4 GM/DL (ref 14–18)
MCH RBC QN AUTO: 31.9 PG (ref 26–34)
MCHC RBC AUTO-ENTMCNC: 34.9 G/DL (ref 28–37)
MCV RBC: 91.3 FL (ref 80–100)
PLATELET # BLD: 183 THOU/UL (ref 150–400)
POTASSIUM SERPL-SCNC: 3.6 MMOL/L (ref 3.5–5.1)
RBC # BLD AUTO: 2.62 MIL/UL (ref 4.5–6)
SODIUM SERPL-SCNC: 141 MMOL/L (ref 136–145)
WBC # BLD AUTO: 7.5 THOU/UL (ref 4–11)

## 2019-03-25 NOTE — NUR
ASSUMED CARE 1900. VSS. ASSESSMENT AS CHARTED. A&OX4 SOMETIMES FORGETFUL.
SR-ST WHEN MOVING. NO SIGNS OF BLEEDING. BI LAT KNEE PAIN CONTROLED WITH PRN
PAIN MEDS PER EMAR. PT DENIES ANY SOA OR OTHER CONERNS.  1/2 NS AND ABX PER
EMAR. POTASSIUM REPLACED PER PROTOCOL, REDRAW WITH LABS THIS AM. WILL CONTINUE
TO MONITOR AND WITH POC.

## 2019-03-25 NOTE — NUR
Patient wanting to leave. Dr. tobin notified as well as RNP with GI. Right hand
PIV removed. Left upper arm PICC line removed without difficulty. Catheter
intact. Pt refused to sign the AMA paperwork. Pt escorted out by security.
Patient had his cell phone and  as well he was dressed in jeans, tennis
shoes, and pt gown.
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
## 2019-04-01 ENCOUNTER — HOSPITAL ENCOUNTER (EMERGENCY)
Dept: HOSPITAL 35 - ER | Age: 69
Discharge: HOME | End: 2019-04-01
Payer: COMMERCIAL

## 2019-04-01 VITALS — WEIGHT: 189.99 LBS | BODY MASS INDEX: 28.79 KG/M2 | HEIGHT: 68 IN

## 2019-04-01 VITALS — SYSTOLIC BLOOD PRESSURE: 148 MMHG | DIASTOLIC BLOOD PRESSURE: 72 MMHG

## 2019-04-01 DIAGNOSIS — Z87.11: ICD-10-CM

## 2019-04-01 DIAGNOSIS — Z79.899: ICD-10-CM

## 2019-04-01 DIAGNOSIS — J18.9: Primary | ICD-10-CM

## 2019-04-01 DIAGNOSIS — I25.10: ICD-10-CM

## 2019-04-01 DIAGNOSIS — E78.5: ICD-10-CM

## 2019-04-01 DIAGNOSIS — Z87.891: ICD-10-CM

## 2019-04-01 DIAGNOSIS — G89.29: ICD-10-CM

## 2019-04-01 DIAGNOSIS — I10: ICD-10-CM

## 2019-04-01 LAB
ANION GAP SERPL CALC-SCNC: 8 MMOL/L (ref 7–16)
BUN SERPL-MCNC: 8 MG/DL (ref 7–18)
CALCIUM SERPL-MCNC: 8.3 MG/DL (ref 8.5–10.1)
CHLORIDE SERPL-SCNC: 108 MMOL/L (ref 98–107)
CO2 SERPL-SCNC: 27 MMOL/L (ref 21–32)
CREAT SERPL-MCNC: 1 MG/DL (ref 0.7–1.3)
ERYTHROCYTE [DISTWIDTH] IN BLOOD BY AUTOMATED COUNT: 16.3 % (ref 10.5–14.5)
GLUCOSE SERPL-MCNC: 87 MG/DL (ref 74–106)
HCT VFR BLD CALC: 33.2 % (ref 42–52)
HGB BLD-MCNC: 10.9 GM/DL (ref 14–18)
MCH RBC QN AUTO: 30.3 PG (ref 26–34)
MCHC RBC AUTO-ENTMCNC: 32.9 G/DL (ref 28–37)
MCV RBC: 92.2 FL (ref 80–100)
PLATELET # BLD: 509 THOU/UL (ref 150–400)
POTASSIUM SERPL-SCNC: 4.4 MMOL/L (ref 3.5–5.1)
RBC # BLD AUTO: 3.6 MIL/UL (ref 4.5–6)
SODIUM SERPL-SCNC: 143 MMOL/L (ref 136–145)
WBC # BLD AUTO: 12.5 THOU/UL (ref 4–11)

## 2019-04-14 ENCOUNTER — HOSPITAL ENCOUNTER (EMERGENCY)
Dept: HOSPITAL 35 - ER | Age: 69
Discharge: HOME | End: 2019-04-14
Payer: COMMERCIAL

## 2019-04-14 VITALS — DIASTOLIC BLOOD PRESSURE: 82 MMHG | SYSTOLIC BLOOD PRESSURE: 137 MMHG

## 2019-04-14 VITALS — WEIGHT: 150 LBS | HEIGHT: 69 IN | BODY MASS INDEX: 22.22 KG/M2

## 2019-04-14 DIAGNOSIS — M46.90: ICD-10-CM

## 2019-04-14 DIAGNOSIS — I25.10: ICD-10-CM

## 2019-04-14 DIAGNOSIS — I10: ICD-10-CM

## 2019-04-14 DIAGNOSIS — M25.562: ICD-10-CM

## 2019-04-14 DIAGNOSIS — M25.561: Primary | ICD-10-CM

## 2019-04-14 DIAGNOSIS — G89.29: ICD-10-CM

## 2019-04-14 DIAGNOSIS — Z87.11: ICD-10-CM

## 2019-04-14 DIAGNOSIS — Z87.891: ICD-10-CM

## 2019-04-29 ENCOUNTER — HOSPITAL ENCOUNTER (OUTPATIENT)
Dept: HOSPITAL 61 - PCVCCLINIC | Age: 69
Discharge: HOME | End: 2019-04-29
Attending: INTERNAL MEDICINE
Payer: MEDICARE

## 2019-04-29 DIAGNOSIS — I10: ICD-10-CM

## 2019-04-29 DIAGNOSIS — E78.5: ICD-10-CM

## 2019-04-29 DIAGNOSIS — I65.23: ICD-10-CM

## 2019-04-29 DIAGNOSIS — Z88.6: ICD-10-CM

## 2019-04-29 DIAGNOSIS — I25.10: Primary | ICD-10-CM

## 2019-04-29 DIAGNOSIS — F17.210: ICD-10-CM

## 2019-04-29 DIAGNOSIS — E78.00: ICD-10-CM

## 2019-04-29 DIAGNOSIS — Z87.19: ICD-10-CM

## 2019-04-29 PROCEDURE — 80061 LIPID PANEL: CPT

## 2019-04-29 PROCEDURE — G0463 HOSPITAL OUTPT CLINIC VISIT: HCPCS

## 2019-04-29 PROCEDURE — 93005 ELECTROCARDIOGRAM TRACING: CPT

## 2019-04-29 PROCEDURE — 36415 COLL VENOUS BLD VENIPUNCTURE: CPT

## 2019-05-13 ENCOUNTER — HOSPITAL ENCOUNTER (EMERGENCY)
Dept: HOSPITAL 35 - ER | Age: 69
Discharge: HOME | End: 2019-05-13
Payer: COMMERCIAL

## 2019-05-13 VITALS — DIASTOLIC BLOOD PRESSURE: 65 MMHG | SYSTOLIC BLOOD PRESSURE: 125 MMHG

## 2019-05-13 VITALS — HEIGHT: 68 IN | WEIGHT: 160.01 LBS | BODY MASS INDEX: 24.25 KG/M2
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
## 2019-05-13 DIAGNOSIS — I25.10: ICD-10-CM

## 2019-05-13 DIAGNOSIS — E78.5: ICD-10-CM

## 2019-05-13 DIAGNOSIS — M54.9: ICD-10-CM

## 2019-05-13 DIAGNOSIS — G89.29: ICD-10-CM

## 2019-05-13 DIAGNOSIS — Z87.891: ICD-10-CM

## 2019-05-13 DIAGNOSIS — F10.129: Primary | ICD-10-CM

## 2019-05-13 DIAGNOSIS — I10: ICD-10-CM

## 2019-05-13 DIAGNOSIS — R41.82: ICD-10-CM

## 2019-05-13 LAB
ANION GAP SERPL CALC-SCNC: 11 MMOL/L (ref 7–16)
BASOPHILS NFR BLD AUTO: 1 % (ref 0–2)
BUN SERPL-MCNC: 9 MG/DL (ref 7–18)
CALCIUM SERPL-MCNC: 7.1 MG/DL (ref 8.5–10.1)
CHLORIDE SERPL-SCNC: 114 MMOL/L (ref 98–107)
CO2 SERPL-SCNC: 21 MMOL/L (ref 21–32)
CREAT SERPL-MCNC: 1 MG/DL (ref 0.7–1.3)
EOSINOPHIL NFR BLD: 0.8 % (ref 0–3)
ERYTHROCYTE [DISTWIDTH] IN BLOOD BY AUTOMATED COUNT: 17.9 % (ref 10.5–14.5)
GLUCOSE SERPL-MCNC: 59 MG/DL (ref 74–106)
GRANULOCYTES NFR BLD MANUAL: 68.6 % (ref 36–66)
HCT VFR BLD CALC: 31.7 % (ref 42–52)
HGB BLD-MCNC: 10.2 GM/DL (ref 14–18)
LYMPHOCYTES NFR BLD AUTO: 18.7 % (ref 24–44)
MCH RBC QN AUTO: 28.9 PG (ref 26–34)
MCHC RBC AUTO-ENTMCNC: 32.1 G/DL (ref 28–37)
MCV RBC: 90.2 FL (ref 80–100)
MONOCYTES NFR BLD: 10.9 % (ref 1–8)
NEUTROPHILS # BLD: 6.8 THOU/UL (ref 1.4–8.2)
PLATELET # BLD: 250 THOU/UL (ref 150–400)
POTASSIUM SERPL-SCNC: 4.5 MMOL/L (ref 3.5–5.1)
RBC # BLD AUTO: 3.52 MIL/UL (ref 4.5–6)
SODIUM SERPL-SCNC: 146 MMOL/L (ref 136–145)
TROPONIN I SERPL-MCNC: <0.06 NG/ML (ref ?–0.06)
WBC # BLD AUTO: 9.9 THOU/UL (ref 4–11)

## 2019-06-12 ENCOUNTER — HOSPITAL ENCOUNTER (OUTPATIENT)
Dept: HOSPITAL 61 - PCVCIMAG | Age: 69
Discharge: HOME | End: 2019-06-12
Attending: INTERNAL MEDICINE
Payer: MEDICARE

## 2019-06-12 DIAGNOSIS — Z95.1: ICD-10-CM

## 2019-06-12 DIAGNOSIS — I65.23: Primary | ICD-10-CM

## 2019-06-12 DIAGNOSIS — E78.00: ICD-10-CM

## 2019-06-12 DIAGNOSIS — I25.10: ICD-10-CM

## 2019-06-12 DIAGNOSIS — E78.5: ICD-10-CM

## 2019-06-12 DIAGNOSIS — I26.99: ICD-10-CM

## 2019-06-12 PROCEDURE — 93325 DOPPLER ECHO COLOR FLOW MAPG: CPT

## 2019-06-12 PROCEDURE — 93351 STRESS TTE COMPLETE: CPT

## 2019-06-12 PROCEDURE — 93880 EXTRACRANIAL BILAT STUDY: CPT

## 2019-06-12 NOTE — PCVCIMAG
--------------- APPROVED REPORT --------------





Study performed:  06/12/2019 11:27:51



Exam:  Stress Echocardiogram

Indication: CAD s/p CABG, Cardiomyopathy, Hypertension

Patient Location: Echo lab

Stress Nurse: Shalini Wei RN

Room #: 2

Status: routine



Ht: 5 ft 8 in  

HR: 93 bpm      BP: 178/98 mmHg

Rhythm: NSR



Medical History

Medical History: CAD s/p CABG, Hyperlipidemia

Cardiac Risk Factors: HTN

Previous Cardiac Procedures: CABG, PCI

Pretest Chest Pain Characteristics: No chest pain

Exercise History: Sedentary

Physical Disabilities: Back, Knees



Procedure

The patient underwent an Exercise Stress Test using a Manual 

Protocol. Blood pressure, heart rate, and EKG were monitored.

An Echocardiogram was performed by technician in four stages in quad 

fashion.  At peak stress, four selected images were obtained and 

placed side by side with resting images for comparison.



Stress Test Details

Stress Test:  Exercise stress was performed using a manual 

protocol.

HR

Resting HR:            93 bpmMax Heart Rate (APMHR): 151 bpm 

Max HR Achieved:  129 bpmTarget HR (85% APMHR): 128 bpm

% of APMHR:         85

Recovery HR:            97 bpm

HR response to stress: Normal HR response to stress



BP

Resting BP:  178/98 mmHg

Max BP:       200/80 mmHg

Recovery BP:       152/84 mmHg

BP response to stress: Hypertensive at rest, meds 

held

ECG

Resting ECG:  Sinus Rhythm, NSSTT changes

Stress ECG:     Sinus Rhythm, nonspecific ST-T abnormalities

ST Change: Non-ischemic

Maximum ST Deviation: -1.10 mm

Arrhythmia:    Rare PVCs

Recovery ECG: Sinus Rhythm

Recovery ST Change: Non-ischemic

Recovery ST Deviation: -1.05 mm

Recovery Arrhythmia: None



Clinical

Reason for Termination: Maximal effort

Stress Symptoms: dyspnea,fatigue

Exercise duration: 5 min 28 sec

Highest Stage Achieved: Manual 1.5mph at 12% incline 

Exercise capacity: 4.6 METs

Overall Exercise Capacity for Age: Poor

Scale: Sedentary

Angina Score: None

No complications.



Stress ECG Conclusion

The patient exercised according to a Manual protocol for 5:28 mins; 

achieving a work level of 4.6 METS. 

The resting heart rate of 93 bpm falguni to a maximum heart rate of 129 

bpm. 

This value represent 85% of the maximal, age-predicted heart rate. 

The resting blood pressure of 178/98 mmHg, falguni to a maximum blood 

pressure of 200/80 mmHg. 

The exercise test was stopped due to fatigue.

Duke Treadmill Score is 10.5 which is Low risk.



Pre-Stress Echo

The resting Echocardiogram showed abnormal left ventricular 

contractility with an estimated Ejection Fraction of about 35-40%. 

The resting Echocardiogram demonstrated wall motion abnormality in 

the septal wall consistent with CABG. .

Global moderate hypokinesis is seen.



Post-Stress Echo

The stress Echocardiogram showed improved left ventricular 

contractility with an estimated Ejection Fraction of about 40-45%. 

All walls showed some improvement in contractility post 

exercise.



Conclusion

Clinical Response:  Non-ischemic

Exercise Capacity:  Below Average

Stress ECG Response:  Non-ischemic

Stress Echo Images:  Non-ischemic

No clinical, EKG or echocardiographic evidence for ischemia. 

No echocardiographic evidence for exercise induced 

ischemia.

Nonischemic stress echocardiogram with maximal exercise 

stress.



<Conclusion>

No clinical, EKG or echocardiographic evidence for ischemia. 

No echocardiographic evidence for exercise induced 

ischemia.

Nonischemic stress echocardiogram with maximal exercise stress.

## 2019-06-12 NOTE — PCVCIMAG
--------------- APPROVED REPORT --------------





Laterality: Bilateral



Doppler Spectral Velocity Analysis

PSV  /  EDVPSV  /  EDV

ECA (R)     60 / 9 cm/sECA (L)     70 / 7 cm/s



dICA (R)    77 / 26 cm/sdICA (L)     71 / 21 cm/s

Jerome (R)     83 / 21 cm/smICA (L)     67 / 18 cm/s

pICA (R)     28 / 8 cm/spICA (L)     42 / 12 cm/s



Bulb (R)        44 / 10 cm/sBulb (L)         55 / 7 cm/s



dCCA (R)     59 / 9 cm/sdCCA (L)     84 / 13 cm/s

mCCA (R)    74 / 9 cm/smCCA (L)    103 / 18 cm/s



Vert (R)     44 / 14 cm/sVert (L)     58 / 13 cm/s

ICA/CCA     1.41ICA/CCA     0.85



Findings

The right carotid bulb has moderate calcified plaque.

The right proximal internal carotid artery shows <40% 

stenosis.

The right common carotid artery shows no significant stenosis.

The right external carotid artery shows no significant stenosis.

The left carotid bulb has moderate  plaque.

The left proximal internal carotid artery shows <40% stenosis.

The left common carotid artery shows no significant stenosis.

The left external carotid artery shows no significant 

stenosis.



Conclusion

1.  Right internal carotid artery stenosis (<40%)

2.  Left internal carotid artery stenosis (<40%)

3.  Antegrade vertebral flow

## 2019-06-20 ENCOUNTER — HOSPITAL ENCOUNTER (EMERGENCY)
Dept: HOSPITAL 35 - ER | Age: 69
Discharge: HOME | End: 2019-06-20
Payer: COMMERCIAL

## 2019-06-20 VITALS — SYSTOLIC BLOOD PRESSURE: 155 MMHG | DIASTOLIC BLOOD PRESSURE: 74 MMHG

## 2019-06-20 VITALS — WEIGHT: 169.01 LBS | HEIGHT: 68 IN | BODY MASS INDEX: 25.61 KG/M2

## 2019-06-20 DIAGNOSIS — M25.562: ICD-10-CM

## 2019-06-20 DIAGNOSIS — Z87.891: ICD-10-CM

## 2019-06-20 DIAGNOSIS — M25.561: Primary | ICD-10-CM

## 2019-06-20 DIAGNOSIS — M54.9: ICD-10-CM

## 2019-06-20 DIAGNOSIS — I10: ICD-10-CM

## 2019-06-20 DIAGNOSIS — I25.10: ICD-10-CM

## 2019-06-20 DIAGNOSIS — G89.29: ICD-10-CM

## 2019-06-20 DIAGNOSIS — E78.5: ICD-10-CM

## 2019-08-03 ENCOUNTER — HOSPITAL ENCOUNTER (EMERGENCY)
Dept: HOSPITAL 35 - ER | Age: 69
Discharge: HOME | End: 2019-08-03
Payer: COMMERCIAL

## 2019-08-03 VITALS — HEIGHT: 68 IN | BODY MASS INDEX: 27.28 KG/M2 | WEIGHT: 180.01 LBS

## 2019-08-03 VITALS — DIASTOLIC BLOOD PRESSURE: 80 MMHG | SYSTOLIC BLOOD PRESSURE: 142 MMHG

## 2019-08-03 DIAGNOSIS — I25.10: ICD-10-CM

## 2019-08-03 DIAGNOSIS — Z98.890: ICD-10-CM

## 2019-08-03 DIAGNOSIS — Z87.891: ICD-10-CM

## 2019-08-03 DIAGNOSIS — R10.13: Primary | ICD-10-CM

## 2019-08-03 DIAGNOSIS — M51.36: ICD-10-CM

## 2019-08-03 DIAGNOSIS — I10: ICD-10-CM

## 2019-08-03 DIAGNOSIS — G89.29: ICD-10-CM

## 2019-08-03 DIAGNOSIS — M25.562: ICD-10-CM

## 2019-08-03 DIAGNOSIS — E78.5: ICD-10-CM

## 2019-08-03 DIAGNOSIS — M25.561: ICD-10-CM

## 2019-08-03 DIAGNOSIS — R11.2: ICD-10-CM

## 2019-08-03 LAB
ALBUMIN SERPL-MCNC: 2.8 G/DL (ref 3.4–5)
ALT SERPL-CCNC: 18 U/L (ref 30–65)
ANION GAP SERPL CALC-SCNC: 3 MMOL/L (ref 7–16)
AST SERPL-CCNC: 35 U/L (ref 15–37)
BASOPHILS NFR BLD AUTO: 0.5 % (ref 0–2)
BILIRUB SERPL-MCNC: 1 MG/DL
BUN SERPL-MCNC: 23 MG/DL (ref 7–18)
CALCIUM SERPL-MCNC: 8.5 MG/DL (ref 8.5–10.1)
CHLORIDE SERPL-SCNC: 100 MMOL/L (ref 98–107)
CO2 SERPL-SCNC: 29 MMOL/L (ref 21–32)
CREAT SERPL-MCNC: 0.9 MG/DL (ref 0.7–1.3)
EOSINOPHIL NFR BLD: 1.3 % (ref 0–3)
ERYTHROCYTE [DISTWIDTH] IN BLOOD BY AUTOMATED COUNT: 16.9 % (ref 10.5–14.5)
GLUCOSE SERPL-MCNC: 125 MG/DL (ref 74–106)
GRANULOCYTES NFR BLD MANUAL: 71.6 % (ref 36–66)
HCT VFR BLD CALC: 40.7 % (ref 42–52)
HGB BLD-MCNC: 13.5 GM/DL (ref 14–18)
LIPASE: 66 U/L (ref 73–393)
LYMPHOCYTES NFR BLD AUTO: 17.1 % (ref 24–44)
MCH RBC QN AUTO: 32.7 PG (ref 26–34)
MCHC RBC AUTO-ENTMCNC: 33.2 G/DL (ref 28–37)
MCV RBC: 98.3 FL (ref 80–100)
MONOCYTES NFR BLD: 9.5 % (ref 1–8)
NEUTROPHILS # BLD: 8.1 THOU/UL (ref 1.4–8.2)
PLATELET # BLD: 310 THOU/UL (ref 150–400)
POTASSIUM SERPL-SCNC: 3.8 MMOL/L (ref 3.5–5.1)
PROT SERPL-MCNC: 7 G/DL (ref 6.4–8.2)
RBC # BLD AUTO: 4.13 MIL/UL (ref 4.5–6)
SODIUM SERPL-SCNC: 132 MMOL/L (ref 136–145)
WBC # BLD AUTO: 11.3 THOU/UL (ref 4–11)